# Patient Record
Sex: MALE | Race: WHITE | Employment: OTHER | URBAN - METROPOLITAN AREA
[De-identification: names, ages, dates, MRNs, and addresses within clinical notes are randomized per-mention and may not be internally consistent; named-entity substitution may affect disease eponyms.]

---

## 2021-01-14 PROBLEM — Z86.718 HISTORY OF DVT (DEEP VEIN THROMBOSIS): Status: ACTIVE | Noted: 2021-01-14

## 2021-01-14 PROBLEM — I47.2 NSVT (NONSUSTAINED VENTRICULAR TACHYCARDIA) (HCC): Status: ACTIVE | Noted: 2020-06-03

## 2021-01-14 PROBLEM — I47.29 NSVT (NONSUSTAINED VENTRICULAR TACHYCARDIA) (HCC): Status: ACTIVE | Noted: 2020-06-03

## 2021-01-14 PROBLEM — D75.89 MACROCYTOSIS WITHOUT ANEMIA: Status: ACTIVE | Noted: 2020-05-09

## 2021-01-14 PROBLEM — Z87.898 HISTORY OF HEAVY ALCOHOL CONSUMPTION: Status: ACTIVE | Noted: 2020-05-09

## 2021-01-14 PROBLEM — K70.31 ALCOHOLIC CIRRHOSIS OF LIVER WITH ASCITES (HCC): Status: ACTIVE | Noted: 2020-05-10

## 2021-03-09 PROBLEM — H90.71 MIXED CONDUCTIVE AND SENSORINEURAL HEARING LOSS OF RIGHT EAR WITH UNRESTRICTED HEARING OF LEFT EAR: Status: ACTIVE | Noted: 2021-03-09

## 2021-03-09 PROBLEM — H66.11 CHRONIC TUBOTYMPANIC SUPPURATIVE OTITIS MEDIA OF RIGHT EAR: Status: ACTIVE | Noted: 2021-03-09

## 2021-03-09 PROBLEM — H70.11 CHRONIC MASTOIDITIS, RIGHT: Status: ACTIVE | Noted: 2021-03-09

## 2021-03-25 RX ORDER — ACETAMINOPHEN 500 MG
1000 TABLET ORAL ONCE
Status: CANCELLED | OUTPATIENT
Start: 2021-03-25 | End: 2021-03-25

## 2021-04-12 ENCOUNTER — LAB ENCOUNTER (OUTPATIENT)
Dept: LAB | Age: 61
End: 2021-04-12
Attending: OTOLARYNGOLOGY
Payer: COMMERCIAL

## 2021-04-12 DIAGNOSIS — H70.11 CHRONIC MASTOIDITIS, RIGHT: ICD-10-CM

## 2021-04-12 NOTE — PROGRESS NOTES
Your Covid-19 PCR testing was negative. Surgery can proceed as scheduled. Please let me know if you have any questions.     Rosie May

## 2021-04-14 ENCOUNTER — ANESTHESIA EVENT (OUTPATIENT)
Dept: SURGERY | Facility: HOSPITAL | Age: 61
End: 2021-04-14
Payer: COMMERCIAL

## 2021-04-15 ENCOUNTER — ANESTHESIA (OUTPATIENT)
Dept: SURGERY | Facility: HOSPITAL | Age: 61
End: 2021-04-15
Payer: COMMERCIAL

## 2021-04-15 ENCOUNTER — HOSPITAL ENCOUNTER (OUTPATIENT)
Facility: HOSPITAL | Age: 61
Discharge: HOME | End: 2021-04-15
Attending: OTOLARYNGOLOGY | Admitting: OTOLARYNGOLOGY
Payer: COMMERCIAL

## 2021-04-15 VITALS
SYSTOLIC BLOOD PRESSURE: 128 MMHG | WEIGHT: 278.25 LBS | OXYGEN SATURATION: 99 % | TEMPERATURE: 98 F | HEART RATE: 58 BPM | HEIGHT: 76 IN | DIASTOLIC BLOOD PRESSURE: 75 MMHG | RESPIRATION RATE: 20 BRPM | BODY MASS INDEX: 33.88 KG/M2

## 2021-04-15 DIAGNOSIS — H90.71 MIXED CONDUCTIVE AND SENSORINEURAL HEARING LOSS OF RIGHT EAR WITH UNRESTRICTED HEARING OF LEFT EAR: ICD-10-CM

## 2021-04-15 DIAGNOSIS — H70.11 CHRONIC MASTOIDITIS OF RIGHT SIDE: ICD-10-CM

## 2021-04-15 DIAGNOSIS — H66.11 CHRONIC TUBOTYMPANIC SUPPURATIVE OTITIS MEDIA OF RIGHT EAR: ICD-10-CM

## 2021-04-15 DIAGNOSIS — H70.11 CHRONIC MASTOIDITIS, RIGHT: Primary | ICD-10-CM

## 2021-04-15 PROCEDURE — 87076 CULTURE ANAEROBE IDENT EACH: CPT | Performed by: OTOLARYNGOLOGY

## 2021-04-15 PROCEDURE — 87205 SMEAR GRAM STAIN: CPT | Performed by: OTOLARYNGOLOGY

## 2021-04-15 PROCEDURE — 87176 TISSUE HOMOGENIZATION CULTR: CPT | Performed by: OTOLARYNGOLOGY

## 2021-04-15 PROCEDURE — 87186 SC STD MICRODIL/AGAR DIL: CPT | Performed by: OTOLARYNGOLOGY

## 2021-04-15 PROCEDURE — 88305 TISSUE EXAM BY PATHOLOGIST: CPT | Performed by: OTOLARYNGOLOGY

## 2021-04-15 PROCEDURE — 87206 SMEAR FLUORESCENT/ACID STAI: CPT | Performed by: OTOLARYNGOLOGY

## 2021-04-15 PROCEDURE — 09B3XZZ EXCISION OF RIGHT EXTERNAL AUDITORY CANAL, EXTERNAL APPROACH: ICD-10-PCS | Performed by: OTOLARYNGOLOGY

## 2021-04-15 PROCEDURE — 87075 CULTR BACTERIA EXCEPT BLOOD: CPT | Performed by: OTOLARYNGOLOGY

## 2021-04-15 PROCEDURE — 87077 CULTURE AEROBIC IDENTIFY: CPT | Performed by: OTOLARYNGOLOGY

## 2021-04-15 PROCEDURE — 87102 FUNGUS ISOLATION CULTURE: CPT | Performed by: OTOLARYNGOLOGY

## 2021-04-15 PROCEDURE — 09R507Z REPLACEMENT OF RIGHT MIDDLE EAR WITH AUTOLOGOUS TISSUE SUBSTITUTE, OPEN APPROACH: ICD-10-PCS | Performed by: OTOLARYNGOLOGY

## 2021-04-15 PROCEDURE — 0NR507Z REPLACEMENT OF RIGHT TEMPORAL BONE WITH AUTOLOGOUS TISSUE SUBSTITUTE, OPEN APPROACH: ICD-10-PCS | Performed by: OTOLARYNGOLOGY

## 2021-04-15 PROCEDURE — 87070 CULTURE OTHR SPECIMN AEROBIC: CPT | Performed by: OTOLARYNGOLOGY

## 2021-04-15 DEVICE — PAPARELLA-TYPE VENT TUBE 1.27 MM I.D. ULTRASIL SILICONE
Type: IMPLANTABLE DEVICE | Site: EAR | Status: FUNCTIONAL
Brand: GYRUS ACMI

## 2021-04-15 RX ORDER — LIDOCAINE HYDROCHLORIDE AND EPINEPHRINE 10; 10 MG/ML; UG/ML
INJECTION, SOLUTION INFILTRATION; PERINEURAL AS NEEDED
Status: DISCONTINUED | OUTPATIENT
Start: 2021-04-15 | End: 2021-04-15 | Stop reason: HOSPADM

## 2021-04-15 RX ORDER — ONDANSETRON 2 MG/ML
INJECTION INTRAMUSCULAR; INTRAVENOUS AS NEEDED
Status: DISCONTINUED | OUTPATIENT
Start: 2021-04-15 | End: 2021-04-15 | Stop reason: SURG

## 2021-04-15 RX ORDER — CEFAZOLIN SODIUM/WATER 2 G/20 ML
SYRINGE (ML) INTRAVENOUS
Status: DISCONTINUED
Start: 2021-04-15 | End: 2021-04-15

## 2021-04-15 RX ORDER — CEFAZOLIN SODIUM/WATER 2 G/20 ML
2 SYRINGE (ML) INTRAVENOUS ONCE
Status: COMPLETED | OUTPATIENT
Start: 2021-04-15 | End: 2021-04-15

## 2021-04-15 RX ORDER — SPIRONOLACTONE 50 MG/1
50 TABLET, FILM COATED ORAL DAILY
COMMUNITY
End: 2021-08-11

## 2021-04-15 RX ORDER — ACETAMINOPHEN 500 MG
1000 TABLET ORAL ONCE AS NEEDED
Status: DISCONTINUED | OUTPATIENT
Start: 2021-04-15 | End: 2021-04-15

## 2021-04-15 RX ORDER — SODIUM CHLORIDE, SODIUM LACTATE, POTASSIUM CHLORIDE, CALCIUM CHLORIDE 600; 310; 30; 20 MG/100ML; MG/100ML; MG/100ML; MG/100ML
INJECTION, SOLUTION INTRAVENOUS CONTINUOUS
Status: DISCONTINUED | OUTPATIENT
Start: 2021-04-15 | End: 2021-04-15

## 2021-04-15 RX ORDER — HYDROCODONE BITARTRATE AND ACETAMINOPHEN 5; 325 MG/1; MG/1
1-2 TABLET ORAL EVERY 4 HOURS PRN
Qty: 30 TABLET | Refills: 0 | Status: SHIPPED | OUTPATIENT
Start: 2021-04-15 | End: 2021-06-02 | Stop reason: ALTCHOICE

## 2021-04-15 RX ORDER — HYDROCODONE BITARTRATE AND ACETAMINOPHEN 5; 325 MG/1; MG/1
2 TABLET ORAL AS NEEDED
Status: COMPLETED | OUTPATIENT
Start: 2021-04-15 | End: 2021-04-15

## 2021-04-15 RX ORDER — HYDROMORPHONE HYDROCHLORIDE 1 MG/ML
0.4 INJECTION, SOLUTION INTRAMUSCULAR; INTRAVENOUS; SUBCUTANEOUS EVERY 5 MIN PRN
Status: DISCONTINUED | OUTPATIENT
Start: 2021-04-15 | End: 2021-04-15

## 2021-04-15 RX ORDER — LIDOCAINE HYDROCHLORIDE 10 MG/ML
INJECTION, SOLUTION EPIDURAL; INFILTRATION; INTRACAUDAL; PERINEURAL AS NEEDED
Status: DISCONTINUED | OUTPATIENT
Start: 2021-04-15 | End: 2021-04-15 | Stop reason: SURG

## 2021-04-15 RX ORDER — CIPROFLOXACIN AND DEXAMETHASONE 3; 1 MG/ML; MG/ML
SUSPENSION/ DROPS AURICULAR (OTIC) AS NEEDED
Status: DISCONTINUED | OUTPATIENT
Start: 2021-04-15 | End: 2021-04-15 | Stop reason: HOSPADM

## 2021-04-15 RX ORDER — EPHEDRINE SULFATE 50 MG/ML
INJECTION INTRAVENOUS AS NEEDED
Status: DISCONTINUED | OUTPATIENT
Start: 2021-04-15 | End: 2021-04-15 | Stop reason: SURG

## 2021-04-15 RX ORDER — ONDANSETRON 2 MG/ML
4 INJECTION INTRAMUSCULAR; INTRAVENOUS AS NEEDED
Status: DISCONTINUED | OUTPATIENT
Start: 2021-04-15 | End: 2021-04-15

## 2021-04-15 RX ORDER — NALOXONE HYDROCHLORIDE 0.4 MG/ML
80 INJECTION, SOLUTION INTRAMUSCULAR; INTRAVENOUS; SUBCUTANEOUS AS NEEDED
Status: DISCONTINUED | OUTPATIENT
Start: 2021-04-15 | End: 2021-04-15

## 2021-04-15 RX ORDER — METOCLOPRAMIDE HYDROCHLORIDE 5 MG/ML
10 INJECTION INTRAMUSCULAR; INTRAVENOUS AS NEEDED
Status: DISCONTINUED | OUTPATIENT
Start: 2021-04-15 | End: 2021-04-15

## 2021-04-15 RX ORDER — HYDROCODONE BITARTRATE AND ACETAMINOPHEN 5; 325 MG/1; MG/1
1 TABLET ORAL AS NEEDED
Status: COMPLETED | OUTPATIENT
Start: 2021-04-15 | End: 2021-04-15

## 2021-04-15 RX ORDER — DEXAMETHASONE SODIUM PHOSPHATE 4 MG/ML
VIAL (ML) INJECTION AS NEEDED
Status: DISCONTINUED | OUTPATIENT
Start: 2021-04-15 | End: 2021-04-15 | Stop reason: SURG

## 2021-04-15 RX ADMIN — EPHEDRINE SULFATE 5 MG: 50 INJECTION INTRAVENOUS at 09:36:00

## 2021-04-15 RX ADMIN — SODIUM CHLORIDE, SODIUM LACTATE, POTASSIUM CHLORIDE, CALCIUM CHLORIDE: 600; 310; 30; 20 INJECTION, SOLUTION INTRAVENOUS at 08:27:00

## 2021-04-15 RX ADMIN — SODIUM CHLORIDE, SODIUM LACTATE, POTASSIUM CHLORIDE, CALCIUM CHLORIDE: 600; 310; 30; 20 INJECTION, SOLUTION INTRAVENOUS at 11:01:00

## 2021-04-15 RX ADMIN — ONDANSETRON 4 MG: 2 INJECTION INTRAMUSCULAR; INTRAVENOUS at 11:25:00

## 2021-04-15 RX ADMIN — DEXAMETHASONE SODIUM PHOSPHATE 8 MG: 4 MG/ML VIAL (ML) INJECTION at 08:50:00

## 2021-04-15 RX ADMIN — EPHEDRINE SULFATE 10 MG: 50 INJECTION INTRAVENOUS at 09:02:00

## 2021-04-15 RX ADMIN — LIDOCAINE HYDROCHLORIDE 50 MG: 10 INJECTION, SOLUTION EPIDURAL; INFILTRATION; INTRACAUDAL; PERINEURAL at 08:31:00

## 2021-04-15 RX ADMIN — CEFAZOLIN SODIUM/WATER 2 G: 2 G/20 ML SYRINGE (ML) INTRAVENOUS at 08:46:00

## 2021-04-15 RX ADMIN — EPHEDRINE SULFATE 10 MG: 50 INJECTION INTRAVENOUS at 08:45:00

## 2021-04-15 RX ADMIN — EPHEDRINE SULFATE 5 MG: 50 INJECTION INTRAVENOUS at 09:40:00

## 2021-04-15 RX ADMIN — EPHEDRINE SULFATE 10 MG: 50 INJECTION INTRAVENOUS at 09:15:00

## 2021-04-15 RX ADMIN — SODIUM CHLORIDE, SODIUM LACTATE, POTASSIUM CHLORIDE, CALCIUM CHLORIDE: 600; 310; 30; 20 INJECTION, SOLUTION INTRAVENOUS at 09:39:00

## 2021-04-15 NOTE — ANESTHESIA PROCEDURE NOTES
Airway  Date/Time: 4/15/2021 8:32 AM  Urgency: elective      General Information and Staff    Patient location during procedure: OR  Anesthesiologist: Sunshine Nelson MD  Performed: anesthesiologist     Indications and Patient Condition  Indications for ai

## 2021-04-15 NOTE — BRIEF OP NOTE
Pre-Operative Diagnosis: Chronic mastoiditis of right side [H70.11]  Chronic tubotympanic suppurative otitis media of right ear [H66.11]  Mixed conductive and sensorineural hearing loss of right ear with unrestricted hearing of left ear [H90.71]     Post-O

## 2021-04-15 NOTE — H&P
The referenced H&P 4/5/21 by Dr. Milo Camargo was reviewed by Su Singh MD on 04/15/21, the patient was examined and no significant changes have occurred in the patient's condition since the H&P was performed.   I discussed with the patient and/or legal rep

## 2021-04-15 NOTE — ANESTHESIA POSTPROCEDURE EVALUATION
[address] Patient Status:  Outpatient in a Bed   Age/Gender 64year old male MRN [id number]   [facility] SURGERY Attending Pennie Nayak MD   Hosp Day # 0 PCP Paul Rooney MD       Anesthesia Post-op Note    R

## 2021-04-15 NOTE — ANESTHESIA PREPROCEDURE EVALUATION
PRE-OP EVALUATION    Patient Name: Francisca Boo    Admit Diagnosis: Chronic mastoiditis of right side [H70.11]  Chronic tubotympanic suppurative otitis media of right ear [H66.11]  Mixed conductive and sensorineural hearing loss of right ear with u MOUTH DAILY WITH BREAKFAST., Disp: 90 tablet, Rfl: 0, 4/15/2021 at Unknown time  metroNIDAZOLE 0.75 % External Gel, Apply a pea-sized amount to entire face each evening., Disp: 45 g, Rfl: 3, Past Week at Unknown time  Azelaic Acid 15 % External Gel, Apply Tobacco Use      Smoking status: Never Smoker      Smokeless tobacco: Never Used    Alcohol use: Not Currently      Alcohol/week: 12.5 standard drinks      Types: 15 Standard drinks or equivalent per week      Comment: 5 x a day  beer, stopped 2020      D

## 2021-04-15 NOTE — OPERATIVE REPORT
Operative Report    [facility] [id number] MRN [id number]   Admission Date 4/15/2021 Operation Date 4/15/2021   Attending Physician Corrinne Honour, MD Operating Physician Enrique Mauricio MD     Pre-Operative Diagnosis: Chronic mastoiditis of prosthesis, and need for additional procedures. Informed consent was obtained. Description of Procedure: The patient was properly identified in the preoperative area, taken back to the operating room, and placed supine on the operating room table.  Anes canal, tegmen, sigmoid sinus, sinodural angle, mastoid tip, antrum, and horizontal semicircular canal. Throughout the mastoid there was granulation tissue, purulence, calcified trabeculated bone, and keratin.  This was removed and sent for culture and patho the skin. The ear canal was again examined and the lateral canal skin was positioned appropriately. The remainder of the ear canal was filled with bacitracin ointment and a Forrest dressing was placed.  The patient was allowed to awaken from general anest

## 2021-04-19 NOTE — PROGRESS NOTES
Pt advised of results per SB; ear culture has grown out 3 bacteria, all unusual in the ear. I recommend augmentin 875mg PO bid x 14 days #28-- please send in prescription for him to start ASAP. I will discuss this with him more at his follow-up this week.

## 2021-04-19 NOTE — PROGRESS NOTES
Please let patient know that his ear culture has grown out 3 bacteria, all unusual in the ear. I recommend augmentin 875mg PO bid x 14 days #28-- please send in prescription for him to start ASAP.  I will discuss this with him more at his follow-up this wee

## 2021-08-23 PROBLEM — K70.9 ALCOHOLIC LIVER DISEASE (HCC): Status: ACTIVE | Noted: 2021-08-23

## 2021-08-23 PROBLEM — K70.31 ALCOHOLIC CIRRHOSIS OF LIVER WITH ASCITES (HCC): Status: RESOLVED | Noted: 2020-05-10 | Resolved: 2021-08-23

## 2021-10-02 ENCOUNTER — LAB ENCOUNTER (OUTPATIENT)
Dept: LAB | Age: 61
End: 2021-10-02
Attending: INTERNAL MEDICINE
Payer: COMMERCIAL

## 2021-10-02 DIAGNOSIS — K21.9 GERD (GASTROESOPHAGEAL REFLUX DISEASE): ICD-10-CM

## 2021-10-05 ENCOUNTER — HOSPITAL ENCOUNTER (OUTPATIENT)
Facility: HOSPITAL | Age: 61
Setting detail: HOSPITAL OUTPATIENT SURGERY
Discharge: HOME | End: 2021-10-05
Attending: INTERNAL MEDICINE | Admitting: INTERNAL MEDICINE
Payer: COMMERCIAL

## 2021-10-05 VITALS
HEIGHT: 77 IN | BODY MASS INDEX: 31.88 KG/M2 | HEART RATE: 59 BPM | TEMPERATURE: 98 F | DIASTOLIC BLOOD PRESSURE: 90 MMHG | WEIGHT: 270 LBS | RESPIRATION RATE: 20 BRPM | OXYGEN SATURATION: 99 % | SYSTOLIC BLOOD PRESSURE: 129 MMHG

## 2021-10-05 DIAGNOSIS — K21.9 GERD (GASTROESOPHAGEAL REFLUX DISEASE): Primary | ICD-10-CM

## 2021-10-05 DIAGNOSIS — K21.9 GASTROESOPHAGEAL REFLUX DISEASE, UNSPECIFIED WHETHER ESOPHAGITIS PRESENT: ICD-10-CM

## 2021-10-05 DIAGNOSIS — Z12.11 SPECIAL SCREENING FOR MALIGNANT NEOPLASMS, COLON: ICD-10-CM

## 2021-10-05 PROCEDURE — 0DBP8ZX EXCISION OF RECTUM, VIA NATURAL OR ARTIFICIAL OPENING ENDOSCOPIC, DIAGNOSTIC: ICD-10-PCS | Performed by: INTERNAL MEDICINE

## 2021-10-05 PROCEDURE — 88305 TISSUE EXAM BY PATHOLOGIST: CPT | Performed by: INTERNAL MEDICINE

## 2021-10-05 PROCEDURE — 99152 MOD SED SAME PHYS/QHP 5/>YRS: CPT | Performed by: INTERNAL MEDICINE

## 2021-10-05 PROCEDURE — 0W3P8ZZ CONTROL BLEEDING IN GASTROINTESTINAL TRACT, VIA NATURAL OR ARTIFICIAL OPENING ENDOSCOPIC: ICD-10-PCS | Performed by: INTERNAL MEDICINE

## 2021-10-05 PROCEDURE — 0DJ08ZZ INSPECTION OF UPPER INTESTINAL TRACT, VIA NATURAL OR ARTIFICIAL OPENING ENDOSCOPIC: ICD-10-PCS | Performed by: INTERNAL MEDICINE

## 2021-10-05 PROCEDURE — 99153 MOD SED SAME PHYS/QHP EA: CPT | Performed by: INTERNAL MEDICINE

## 2021-10-05 RX ORDER — MIDAZOLAM HYDROCHLORIDE 1 MG/ML
INJECTION INTRAMUSCULAR; INTRAVENOUS
Status: DISCONTINUED | OUTPATIENT
Start: 2021-10-05 | End: 2021-10-05

## 2021-10-05 RX ORDER — SODIUM CHLORIDE, SODIUM LACTATE, POTASSIUM CHLORIDE, CALCIUM CHLORIDE 600; 310; 30; 20 MG/100ML; MG/100ML; MG/100ML; MG/100ML
INJECTION, SOLUTION INTRAVENOUS CONTINUOUS
Status: DISCONTINUED | OUTPATIENT
Start: 2021-10-05 | End: 2021-10-05

## 2021-10-05 NOTE — OPERATIVE REPORT
[address] Patient Status:  Hospital Outpatient Surgery    1960 MRN [id number]   Location [id number] [place] [address] Attending Glynn Edwadrs MD   Hosp Day # 0 PCP Mathew Montes MD         PATIENT NAME: Herminio Cruzestefany was further carefully inspected. There were diverticula noted in the left colon. The remainder of the entire examined colon was otherwise normal.  There was a 12 mm polyp in the rectum that was completely removed with a hot snare and retrieved.   Endocl

## 2021-10-05 NOTE — H&P
[facility] GASTROENTEROLOGY    REFERRING PHYSICIAN: Dr. Rogelio Thompson is a 63year old male.   GERD heartburn cirrhosis CRC screening    See note reviewed from 8/23/21    PROCEDURE: EGD colon    Allergies: Mushroom Extract Compl patient/family. They understand and agree to proceed with the plan of care. I have reviewed the History and Physical performed. I have examined this patient today and any changes are documented above.      Mercedez Kramer MD  10/5/2021  1:08 PM

## 2021-10-07 NOTE — PROGRESS NOTES
Here are the biopsy/pathology findings from your recent Colonoscopy : The colon polyp removed was benign (adenoma), but precancerous. A repeat colonoscopy exam in 3 years is recommended. Left message with results on private voicemail.   Sent to [facility]

## 2021-12-10 PROBLEM — R26.89 IMBALANCE: Status: ACTIVE | Noted: 2021-12-10

## 2021-12-10 PROBLEM — R53.81 PHYSICAL DECONDITIONING: Status: ACTIVE | Noted: 2021-12-10

## 2024-09-20 RX ORDER — ENOXAPARIN SODIUM 100 MG/ML
0.73 INJECTION SUBCUTANEOUS 2 TIMES DAILY
COMMUNITY

## 2024-10-10 ENCOUNTER — ANESTHESIA (OUTPATIENT)
Dept: ENDOSCOPY | Facility: HOSPITAL | Age: 64
End: 2024-10-10
Payer: COMMERCIAL

## 2024-10-10 ENCOUNTER — HOSPITAL ENCOUNTER (OUTPATIENT)
Facility: HOSPITAL | Age: 64
Setting detail: HOSPITAL OUTPATIENT SURGERY
Discharge: HOME | End: 2024-10-10
Attending: INTERNAL MEDICINE | Admitting: INTERNAL MEDICINE
Payer: COMMERCIAL

## 2024-10-10 ENCOUNTER — ANESTHESIA EVENT (OUTPATIENT)
Dept: ENDOSCOPY | Facility: HOSPITAL | Age: 64
End: 2024-10-10
Payer: COMMERCIAL

## 2024-10-10 VITALS
SYSTOLIC BLOOD PRESSURE: 117 MMHG | WEIGHT: 245 LBS | DIASTOLIC BLOOD PRESSURE: 65 MMHG | OXYGEN SATURATION: 100 % | BODY MASS INDEX: 29.83 KG/M2 | HEIGHT: 76 IN | RESPIRATION RATE: 20 BRPM | HEART RATE: 52 BPM | TEMPERATURE: 98 F

## 2024-10-10 PROCEDURE — 0DJD8ZZ INSPECTION OF LOWER INTESTINAL TRACT, VIA NATURAL OR ARTIFICIAL OPENING ENDOSCOPIC: ICD-10-PCS | Performed by: INTERNAL MEDICINE

## 2024-10-10 RX ORDER — NALOXONE HYDROCHLORIDE 0.4 MG/ML
0.08 INJECTION, SOLUTION INTRAMUSCULAR; INTRAVENOUS; SUBCUTANEOUS ONCE AS NEEDED
Status: DISCONTINUED | OUTPATIENT
Start: 2024-10-10 | End: 2024-10-10

## 2024-10-10 RX ORDER — SODIUM CHLORIDE, SODIUM LACTATE, POTASSIUM CHLORIDE, CALCIUM CHLORIDE 600; 310; 30; 20 MG/100ML; MG/100ML; MG/100ML; MG/100ML
INJECTION, SOLUTION INTRAVENOUS CONTINUOUS
Status: DISCONTINUED | OUTPATIENT
Start: 2024-10-10 | End: 2024-10-10

## 2024-10-10 RX ORDER — ONDANSETRON 2 MG/ML
4 INJECTION INTRAMUSCULAR; INTRAVENOUS ONCE AS NEEDED
Status: DISCONTINUED | OUTPATIENT
Start: 2024-10-10 | End: 2024-10-10

## 2024-10-10 RX ADMIN — SODIUM CHLORIDE, SODIUM LACTATE, POTASSIUM CHLORIDE, CALCIUM CHLORIDE: 600; 310; 30; 20 INJECTION, SOLUTION INTRAVENOUS at 07:33:00

## 2024-10-10 NOTE — DISCHARGE INSTRUCTIONS
[facility]    Procedure(s): Colonoscopy    Findings:    1.  Diverticulosis    Disposition:  home    Patient Instructions:     1.  Consume a high fiber diet.  2.  Repeat colonoscopy in 5 years.      Eduar Hebert MD

## 2024-10-10 NOTE — ANESTHESIA POSTPROCEDURE EVALUATION
[facility]    Ernesto Jefferson Patient Status:  Hospital Outpatient Surgery   Age/Gender 64 year old male MRN [id number]   Location [facility] ENDOSCOPY PAIN CENTER Attending Eduar Hebert MD   Hosp Day # 0 PCP David Dahl MD       Anesthesia Post-op Note    COLONOSCOPY    Procedure Summary       Date: 10/10/24 Room / Location:  ENDOSCOPY 02 / [facility] ENDOSCOPY    Anesthesia Start: 0733 Anesthesia Stop: 0757    Procedure: COLONOSCOPY Diagnosis: (Diverticulosis)    Surgeons: Eduar Hebert MD Anesthesiologist: Donnell Kwon MD    Anesthesia Type: MAC ASA Status: 3            Anesthesia Type: MAC    Vitals Value Taken Time   BP 99/ 61 10/10/24 0757   Temp  10/10/24 0757   Pulse 55 10/10/24 0757   Resp 14 10/10/24 0757   SpO2 100 10/10/24 0757       Patient Location: Endoscopy    Anesthesia Type: MAC    Airway Patency: patent    Postop Pain Control: adequate    Mental Status: preanesthetic baseline    Nausea/Vomiting: none    Cardiopulmonary/Hydration status: stable euvolemic    Complications: no apparent anesthesia related complications    Postop vital signs: stable    Comments: Report given to RN    Dental Exam: Unchanged from Preop    Patient to be discharged home when criteria met.

## 2024-10-10 NOTE — ANESTHESIA PREPROCEDURE EVALUATION
PRE-OP EVALUATION    Patient Name: Ernesto Jefferson    Admit Diagnosis: PERSONAL HISTORY OF COLONIC POLYPS    Pre-op Diagnosis: PERSONAL HISTORY OF COLONIC POLYPS    COLONOSCOPY    Anesthesia Procedure: COLONOSCOPY    Surgeons and Role:     * Eduar Hebert MD - Primary    Pre-op vitals reviewed.  Temp: 98 °F (36.7 °C)  Pulse: 53  Resp: 20  BP: 123/81  SpO2: 100 %  Body mass index is 29.82 kg/m².    Current medications reviewed.  Hospital Medications:   lactated ringers infusion   Intravenous Continuous       Outpatient Medications:   Prescriptions Prior to Admission[1]    Allergies: Mushroom extract complex      Anesthesia Evaluation    Patient summary reviewed.    Anesthetic Complications           GI/Hepatic/Renal      (+) GERD          (+) liver disease                 Cardiovascular    Negative cardiovascular ROS.              (+) hypertension       (+) past MI                              Endo/Other                                  Pulmonary                           Neuro/Psych                                      Past Surgical History:   Procedure Laterality Date    Arthroscopy of joint unlisted      Colonoscopy  10/5/21= Diverticulosis, Polyp (TA)    Repeat 2024    Colonoscopy N/A 10/5/2021    Procedure: COLONOSCOPY AND ESOPHAGOGASTRODUODENOSCOPY (EGD);  Surgeon: Eduar Hebert MD;  Location:  ENDOSCOPY    Incision eardrum,aspir,gen anesth Right 03/26/2021    Laparoscopic repair of initial  8/30/2013    Procedure: LAPAROSCOPIC INGUINAL HERNIORRAPHY;  Surgeon: Terry Srinivasan MD;  [facility]    Other surgical history      foot surgery x2    Tonsillectomy      Tympanoplas/mastoidec,rad,rebld ossi Right 03/26/2021    Upper gi endoscopy performed  10/5/21= Hiatal hernia, no varices     Social History     Socioeconomic History    Marital status:    Tobacco Use    Smoking status: Never    Smokeless tobacco: Never   Vaping Use    Vaping status: Never Used   Substance and Sexual  Activity    Alcohol use: Not Currently     Comment: quit 2019    Drug use: No    Sexual activity: Never     History   Drug Use No     Available pre-op labs reviewed.               Airway      Mallampati: II  Mouth opening: >3 FB    Neck ROM: full Cardiovascular    Cardiovascular exam normal.         Dental             Pulmonary    Pulmonary exam normal.                 Other findings              ASA: 3   Plan: MAC  NPO status verified and           Plan/risks discussed with: patient                Present on Admission:  **None**             [1]   Medications Prior to Admission   Medication Sig Dispense Refill Last Dose/Taking    enoxaparin (LOVENOX) 80 MG/0.8ML Injection Solution Prefilled Syringe Inject 0.73 mL (73 mg total) into the skin 2 (two) times daily.   10/9/2024    rosuvastatin 5 MG Oral Tab Take 1 tablet (5 mg total) by mouth nightly. 90 tablet 0 10/9/2024    rivaroxaban (XARELTO) 20 MG Oral Tab Take 1 tablet (20 mg total) by mouth at bedtime. RESTART FRI 4/16/21 Takes at 7pm (Patient taking differently: Take 1 tablet (20 mg total) by mouth at bedtime.) 90 tablet 0 10/7/2024    pantoprazole 40 MG Oral Tab EC Take 1 tablet (40 mg total) by mouth every morning before breakfast. Take 40 mg by mouth. 90 tablet 0 Unknown    furosemide 40 MG Oral Tab Take 1 tablet (40 mg total) by mouth daily. 90 tablet 0 10/9/2024    nadolol 20 MG Oral Tab Take 1 tablet (20 mg total) by mouth daily. 90 tablet 0 10/9/2024    spironolactone 50 MG Oral Tab Take 1 tablet (50 mg total) by mouth daily with breakfast. 90 tablet 0 10/9/2024    metroNIDAZOLE 0.75 % External Gel Apply a pea-sized amount to entire face each evening. (Patient taking differently: Apply a pea-sized amount to entire face each evening prn) 45 g 3 Taking Differently    Azelaic Acid 15 % External Gel Apply a pea-sized amount to entire face each morning. (Patient taking differently: Apply a pea-sized amount to entire face each morning prn) 50 g 3 Taking  Differently    Melatonin 5 MG Oral Tab Take 1-2 tablets (5-10 mg total) by mouth nightly as needed.   Taking As Needed    Multiple Vitamins-Minerals (MULTI FOR HIM) Oral Tab Take 1 tablet by mouth daily.     Past Week

## 2024-10-10 NOTE — OPERATIVE REPORT
[facility]    Ernesto Jefferson Patient Status:  Hospital Outpatient Surgery    1960 MRN [id number]   Location [facility] ENDOSCOPY PAIN CENTER Attending Eduar Hebert MD   Hosp Day # 0 PCP David Dahl MD         PATIENT NAME: Ernesto Jefferson  DATE OF OPERATION: 10/10/2024    PREOPERATIVE DIAGNOSIS:  CRC screening Hx polyp  POSTOPERATIVE DIAGNOSIS:  Diverticulosis    PROCEDURE PERFORMED: Colonoscopy with MAC sedation  SURGEON: Eduar Hebert MD   MEDICATIONS: MAC IV in divided doses under the supervision of Anesthesia.  PROCEDURE AND FINDINGS: The patient was placed into the left lateral decubitus position after informed consent was obtained.  All questions were answered.  MAC IV sedation was administered.  A rectal exam was performed which was normal.  The Olympus video colonoscope was then introduced through the rectum and advanced through the colon to the cecum. The quality of prep was excellent, adequate, Aronchick 1. The cecum was identified by the ileocecal valve and appendiceal orifice. The colonoscope was then withdrawn and the mucosa was further carefully inspected.  There were diverticula noted in the sigmoid colon.    The remainder of the entire examined colon was otherwise normal.  After retroflexion in the rectum, the colonoscope was straightened and removed and the procedure was completed. The patient tolerated the procedure well. There were no implants placed nor significant blood loss. There were no immediate apparent complications.   Moderate sedation time:  None.  Deep sedation provided by anesthesia    RECOMMENDATIONS   Consume a high fiber diet.  Repeat colonoscopy in 5 years    Eduar Hebert MD

## 2024-10-10 NOTE — H&P
[facility] GASTROENTEROLOGY    REFERRING PHYSICIAN: Dr. Hesham Orozco Jose Miguel Jefferson is a 64 year old male.  CRCscreening    See Kel note reviewed from 2/22/24    PROCEDURE: Colon    Allergies: Mushroom extract complex  No current outpatient medications on file.     Past Medical History:    Alcoholic liver disease (HCC)    US [place] 2/21 F 1-2    Back problem    Deep vein thrombosis (HCC)    left leg    GERD (gastroesophageal reflux disease)    Hearing impaired person, bilateral    hearing aid right ear only    High blood pressure    High cholesterol    History of blood transfusion    2019 at [facility]--no reaction    Hx of motion sickness    Left inguinal hernia    s/p laparoscopic repair    Lupus anticoagulant positive    Psoriasis    Pulmonary embolism (HCC)    Visual impairment    glasses     Past Surgical History:   Procedure Laterality Date    Arthroscopy of joint unlisted      Colonoscopy  10/5/21= Diverticulosis, Polyp (TA)    Repeat 2024    Colonoscopy N/A 10/5/2021    Procedure: COLONOSCOPY AND ESOPHAGOGASTRODUODENOSCOPY (EGD);  Surgeon: Eduar Hebert MD;  Location:  ENDOSCOPY    Incision eardrum,aspir,gen anesth Right 03/26/2021    Laparoscopic repair of initial  8/30/2013    Procedure: LAPAROSCOPIC INGUINAL HERNIORRAPHY;  Surgeon: Terry Srinivasan MD;  Location: [facility] SURGICAL [facility]    Other surgical history      foot surgery x2    Tonsillectomy      Tympanoplas/mastoidec,rad,rebld ossi Right 03/26/2021    Upper gi endoscopy performed  10/5/21= Hiatal hernia, no varices     Social History     Socioeconomic History    Marital status:    Tobacco Use    Smoking status: Never    Smokeless tobacco: Never   Vaping Use    Vaping status: Never Used   Substance and Sexual Activity    Alcohol use: Not Currently     Comment: quit 2019    Drug use: No    Sexual activity: Never     Social Drivers of Health      Received from [facility]    Social Connections    Received from  [facility]    Housing Stability         Exam:  /81 (BP Location: Left arm)   Pulse 53   Temp 98 °F (36.7 °C)   Resp 20   Ht 6' 4\" (1.93 m)   Wt 245 lb (111.1 kg)   SpO2 100%   BMI 29.82 kg/m²  - Body mass index is 29.82 kg/m²., A+0x3, HEENT: non-icteric, LUNGS: CTA, HEART: RRR, ABDOMEN:+BS, soft, non-tender, no guarding, EXTREM: no peripheral edema      ASSESSMENT AND PLAN:  Ernesto Jefferson is a 64 year old male.  CRC screening    1.  Colon    I have discussed the risks and benefits and alternatives with the patient/family.  They understand and agree to proceed with the plan of care.    I have reviewed the History and Physical performed.  I have examined this patient today and any changes are documented above.     Eduar Hebert MD  10/10/2024  7:32 AM

## (undated) DEVICE — WIPE WITH WICK AND CORNEAL SHIELD: Brand: MEROCEL

## (undated) DEVICE — SOL  .9 1000ML BTL

## (undated) DEVICE — ELECTRODE 8227410 PAIRED 2 CH SET ROHS

## (undated) DEVICE — CAUTERY BLADE 2IN INS E1455

## (undated) DEVICE — SYRINGE 10ML LL CONTRL SYRINGE

## (undated) DEVICE — BLADE BEAVER EAR 7200

## (undated) DEVICE — CLIP LGT 11MM OPEN 2.8MM 235CM

## (undated) DEVICE — 1010 S-DRAPE TOWEL DRAPE 10/BX: Brand: STERI-DRAPE™

## (undated) DEVICE — ANGIO CATH 16GX2

## (undated) DEVICE — KIT VLV 5 PC AIR H2O SUCT BX ENDOGATOR CONN

## (undated) DEVICE — ENDOSCOPY PACK UPPER: Brand: MEDLINE INDUSTRIES, INC.

## (undated) DEVICE — V2 SPECIMEN COLLECTION MANIFOLD KIT: Brand: NEPTUNE

## (undated) DEVICE — GOWN,SIRUS,FABRIC-REINFORCED,X-LARGE: Brand: MEDLINE

## (undated) DEVICE — FILTERLINE NASAL ADULT O2/CO2

## (undated) DEVICE — PROBE 8225101 5PK STD PRASS FL TIP ROHS

## (undated) DEVICE — SCD SLEEVE KNEE HI BLEND

## (undated) DEVICE — STRL PENROSE DRAIN 18" X 1/4": Brand: CARDINAL HEALTH

## (undated) DEVICE — ENDOSCOPY PACK - LOWER: Brand: MEDLINE INDUSTRIES, INC.

## (undated) DEVICE — SNARE CAPTIFLEX MICRO-OVL OLY

## (undated) DEVICE — SUTURE PLAIN GUT 5-0 PC-1

## (undated) DEVICE — SYRINGE 10ML LL TIP

## (undated) DEVICE — COTTON BALLS LG STERILE

## (undated) DEVICE — MICRO KOVER: Brand: UNBRANDED

## (undated) DEVICE — 1200CC GUARDIAN II: Brand: GUARDIAN

## (undated) DEVICE — STANDARD HYPODERMIC NEEDLE,POLYPROPYLENE HUB: Brand: MONOJECT

## (undated) DEVICE — HEAD AND NECK CDS-LF: Brand: MEDLINE INDUSTRIES, INC.

## (undated) DEVICE — DRESSING GLASSCOCK EAR ADULT

## (undated) DEVICE — 10FT COMBINED O2 DELIVERY/CO2 MONITORING. FILTER WITH MICROSTREAM TYPE LUER: Brand: DUAL ADULT NASAL CANNULA

## (undated) DEVICE — MINI-BLADE®: Brand: BEAVER®

## (undated) DEVICE — STERILE POLYISOPRENE POWDER-FREE SURGICAL GLOVES: Brand: PROTEXIS

## (undated) DEVICE — 3M™ RED DOT™ MONITORING ELECTRODE WITH FOAM TAPE AND STICKY GEL, 50/BAG, 20/CASE, 72/PLT 2570: Brand: RED DOT™

## (undated) DEVICE — Device: Brand: DEFENDO AIR/WATER/SUCTION AND BIOPSY VALVE

## (undated) DEVICE — UNDYED BRAIDED (POLYGLACTIN 910), SYNTHETIC ABSORBABLE SUTURE: Brand: COATED VICRYL

## (undated) DEVICE — KIT CUSTOM ENDOPROCEDURE STERIS

## (undated) DEVICE — TRAP 4 CPTR CHMBR N EZ INLN

## (undated) DEVICE — 3M™ STERI-DRAPE™ MEDIUM DRAPE WITH APERTURE 1030: Brand: STERI-DRAPE™

## (undated) DEVICE — Device

## (undated) DEVICE — PETRI DISH 31-019

## (undated) DEVICE — SPECIMEN CONTAINER,POSITIVE SEAL INDICATOR, OR PACKAGED: Brand: PRECISION

## (undated) DEVICE — SPONGE STICK WITH PVP-I: Brand: KENDALL

## (undated) DEVICE — NON-ADHERENT PAD PREPACK: Brand: TELFA

## (undated) DEVICE — SUTURE MONOCRYL 5-0 P-3

## (undated) NOTE — LETTER
[facility]  Phone: [phone]  OUTSIDE TESTING RESULT REQUEST      TO:   Dr. Michelle Smith Date: 3/25/21    FAX #: [phone]     IMPORTANT: FOR YOUR IMMEDIATE ATTENTION  Please FAX all test results listed below to: [phone]

## (undated) NOTE — LETTER
10/8/2021          [phone]    Dear Meagan Li,       Here are the biopsy/pathology findings from your recent Colonoscopy : The colon polyp removed was benign (adenoma), but precancerous.    A repeat